# Patient Record
(demographics unavailable — no encounter records)

---

## 2025-07-10 NOTE — DISCUSSION/SUMMARY
[de-identified] : 20 yr old F here with 1 year of L sided knee pain likely 2/2 patellar maltracking.  Plan:  1. Referral given for physical therapy and home exercise program also provided 2. Follow up in 2 months. Consider MRI if problem persists despite PT

## 2025-07-10 NOTE — HISTORY OF PRESENT ILLNESS
[de-identified] : Patient is a 20 yr old F PMHx asthma here with 1 year of L knee pain.  Patient reports pain started about 1 year ago after a "karate exam." Denies any clear moment when a known injury occurred.  Patient describes pain as persistently dull, but sharp at times with squatting.  Pain is on lateral aspect of L knee and does not significantly limit activity. Patient has not trialed any OTC medications or physical therapy.

## 2025-07-10 NOTE — PHYSICAL EXAM
[de-identified] :  Knee (L)  Inspection Skin: normal Effusion: normal Bursa swelling: none  Palpation Tenderness: + TTP in lateral posterior fossa.  Crepitus: none. Defect: none. Popliteal fullness: negative.  Flexion Active ROM: normal Passive ROM: normal    Extension Normal  Straight Leg Raise- can perform Motor strength Flexion: 5/5 Extension: 5/5 Hip abduction: 4/5 on L and 5/5 on R  Sensory index Normal.  ACL/PCL tests Lachman test: stable Anterior drawer: stable Posterior drawer: stable  MCL/LCL tests MCL laxity: stable LCL laxity: stable  Patellofemoral tests Patellar apprehension: negative  Single and. double leg squat performed without abnormalities [de-identified] :  XR of left knee  Date: 6/2/2025   Views: 3 Performed at [facility]: Yes Impression:  No fracture, no dislocation, no malalignment  These images were personally reviewed with original findings documented as above